# Patient Record
Sex: MALE | Race: WHITE | NOT HISPANIC OR LATINO | Employment: FULL TIME | URBAN - METROPOLITAN AREA
[De-identification: names, ages, dates, MRNs, and addresses within clinical notes are randomized per-mention and may not be internally consistent; named-entity substitution may affect disease eponyms.]

---

## 2023-08-24 ENCOUNTER — APPOINTMENT (OUTPATIENT)
Dept: PRIMARY CARE | Facility: CLINIC | Age: 63
End: 2023-08-24
Payer: COMMERCIAL

## 2023-09-15 ENCOUNTER — LAB (OUTPATIENT)
Dept: LAB | Facility: LAB | Age: 63
End: 2023-09-15
Payer: COMMERCIAL

## 2023-09-15 ENCOUNTER — OFFICE VISIT (OUTPATIENT)
Dept: PRIMARY CARE | Facility: CLINIC | Age: 63
End: 2023-09-15
Payer: COMMERCIAL

## 2023-09-15 VITALS
OXYGEN SATURATION: 96 % | BODY MASS INDEX: 28.35 KG/M2 | HEART RATE: 53 BPM | WEIGHT: 198 LBS | DIASTOLIC BLOOD PRESSURE: 66 MMHG | SYSTOLIC BLOOD PRESSURE: 117 MMHG | HEIGHT: 70 IN

## 2023-09-15 DIAGNOSIS — E78.5 HYPERLIPIDEMIA, UNSPECIFIED HYPERLIPIDEMIA TYPE: Primary | ICD-10-CM

## 2023-09-15 DIAGNOSIS — E78.5 HYPERLIPIDEMIA, UNSPECIFIED HYPERLIPIDEMIA TYPE: ICD-10-CM

## 2023-09-15 DIAGNOSIS — M54.2 NECK PAIN: ICD-10-CM

## 2023-09-15 PROBLEM — M18.12 LOCALIZED PRIMARY OSTEOARTHRITIS OF FIRST CARPOMETACARPAL JOINT OF LEFT WRIST: Status: ACTIVE | Noted: 2023-09-15

## 2023-09-15 PROBLEM — C69.90: Status: ACTIVE | Noted: 2023-09-15

## 2023-09-15 LAB
ALANINE AMINOTRANSFERASE (SGPT) (U/L) IN SER/PLAS: 23 U/L (ref 10–52)
ALBUMIN (G/DL) IN SER/PLAS: 4.5 G/DL (ref 3.4–5)
ALKALINE PHOSPHATASE (U/L) IN SER/PLAS: 67 U/L (ref 33–136)
ANION GAP IN SER/PLAS: 13 MMOL/L (ref 10–20)
ASPARTATE AMINOTRANSFERASE (SGOT) (U/L) IN SER/PLAS: 14 U/L (ref 9–39)
BILIRUBIN TOTAL (MG/DL) IN SER/PLAS: 0.6 MG/DL (ref 0–1.2)
CALCIUM (MG/DL) IN SER/PLAS: 9.7 MG/DL (ref 8.6–10.6)
CARBON DIOXIDE, TOTAL (MMOL/L) IN SER/PLAS: 28 MMOL/L (ref 21–32)
CHLORIDE (MMOL/L) IN SER/PLAS: 104 MMOL/L (ref 98–107)
CHOLESTEROL (MG/DL) IN SER/PLAS: 158 MG/DL (ref 0–199)
CHOLESTEROL IN HDL (MG/DL) IN SER/PLAS: 24.3 MG/DL
CHOLESTEROL/HDL RATIO: 6.5
CREATININE (MG/DL) IN SER/PLAS: 1.19 MG/DL (ref 0.5–1.3)
GFR MALE: 69 ML/MIN/1.73M2
GLUCOSE (MG/DL) IN SER/PLAS: 98 MG/DL (ref 74–99)
LDL: ABNORMAL MG/DL (ref 0–99)
POTASSIUM (MMOL/L) IN SER/PLAS: 4.3 MMOL/L (ref 3.5–5.3)
PROSTATE SPECIFIC AG (NG/ML) IN SER/PLAS: 0.31 NG/ML (ref 0–4)
PROTEIN TOTAL: 7 G/DL (ref 6.4–8.2)
SODIUM (MMOL/L) IN SER/PLAS: 141 MMOL/L (ref 136–145)
TRIGLYCERIDE (MG/DL) IN SER/PLAS: 520 MG/DL (ref 0–149)
UREA NITROGEN (MG/DL) IN SER/PLAS: 20 MG/DL (ref 6–23)
VLDL: ABNORMAL MG/DL (ref 0–40)

## 2023-09-15 PROCEDURE — 36415 COLL VENOUS BLD VENIPUNCTURE: CPT

## 2023-09-15 PROCEDURE — 80061 LIPID PANEL: CPT

## 2023-09-15 PROCEDURE — 80053 COMPREHEN METABOLIC PANEL: CPT

## 2023-09-15 PROCEDURE — 84153 ASSAY OF PSA TOTAL: CPT

## 2023-09-15 PROCEDURE — 99214 OFFICE O/P EST MOD 30 MIN: CPT | Performed by: FAMILY MEDICINE

## 2023-09-15 RX ORDER — OMEGA-3-ACID ETHYL ESTERS 1 G/1
2 CAPSULE, LIQUID FILLED ORAL DAILY
COMMUNITY
Start: 2014-08-21 | End: 2023-11-21

## 2023-09-15 RX ORDER — SIMVASTATIN 20 MG/1
20 TABLET, FILM COATED ORAL DAILY
Qty: 90 TABLET | Refills: 3 | Status: SHIPPED | OUTPATIENT
Start: 2023-09-15

## 2023-09-15 RX ORDER — SIMVASTATIN 20 MG/1
1 TABLET, FILM COATED ORAL DAILY
COMMUNITY
Start: 2014-08-21 | End: 2023-09-15 | Stop reason: SDUPTHER

## 2023-09-15 ASSESSMENT — ENCOUNTER SYMPTOMS
RESPIRATORY NEGATIVE: 1
MUSCULOSKELETAL NEGATIVE: 1
CARDIOVASCULAR NEGATIVE: 1
GASTROINTESTINAL NEGATIVE: 1
NEUROLOGICAL NEGATIVE: 1

## 2023-09-27 ENCOUNTER — TELEPHONE (OUTPATIENT)
Dept: PRIMARY CARE | Facility: CLINIC | Age: 63
End: 2023-09-27
Payer: COMMERCIAL

## 2023-09-27 NOTE — TELEPHONE ENCOUNTER
Patient waiting on results from blood and xrays.  Had appointment on 9/15/23.  Very upset with the office that he is not getting calls returned.

## 2023-09-28 NOTE — TELEPHONE ENCOUNTER
Patient called asking for the x-ray results. It has been 13 days and still no response. Patient is upset.

## 2023-10-02 ENCOUNTER — TELEPHONE (OUTPATIENT)
Dept: PRIMARY CARE | Facility: CLINIC | Age: 63
End: 2023-10-02
Payer: COMMERCIAL

## 2023-10-02 DIAGNOSIS — M50.20 DISCOGENIC SYNDROME, CERVICAL: Primary | ICD-10-CM

## 2023-10-02 NOTE — TELEPHONE ENCOUNTER
I gave the message on His X-Ray. If you want to order MRI, he will go get, and then schedule a fuv with you.

## 2023-10-16 ENCOUNTER — HOSPITAL ENCOUNTER (OUTPATIENT)
Dept: RADIOLOGY | Facility: CLINIC | Age: 63
Discharge: HOME | End: 2023-10-16
Payer: COMMERCIAL

## 2023-10-16 DIAGNOSIS — M50.20 DISCOGENIC SYNDROME, CERVICAL: ICD-10-CM

## 2023-10-16 PROCEDURE — 72141 MRI NECK SPINE W/O DYE: CPT | Performed by: RADIOLOGY

## 2023-10-16 PROCEDURE — 72141 MRI NECK SPINE W/O DYE: CPT

## 2023-10-25 ENCOUNTER — TELEPHONE (OUTPATIENT)
Dept: PRIMARY CARE | Facility: CLINIC | Age: 63
End: 2023-10-25
Payer: COMMERCIAL

## 2023-11-01 PROBLEM — E78.5 HYPERLIPEMIA: Status: ACTIVE | Noted: 2023-11-01

## 2023-11-01 PROBLEM — R35.1 NOCTURIA: Status: ACTIVE | Noted: 2023-11-01

## 2023-11-01 PROBLEM — M25.373 INSTABILITY OF ANKLE: Status: ACTIVE | Noted: 2023-11-01

## 2023-11-01 PROBLEM — C80.1 MALIGNANT NEOPLASM (MULTI): Status: ACTIVE | Noted: 2023-11-01

## 2023-11-01 PROBLEM — M25.879 ANKLE IMPINGEMENT SYNDROME: Status: ACTIVE | Noted: 2023-11-01

## 2023-11-01 PROBLEM — S39.012A STRAIN OF LUMBAR REGION: Status: ACTIVE | Noted: 2023-11-01

## 2023-11-01 PROBLEM — M76.70 PERONEAL TENDONITIS: Status: ACTIVE | Noted: 2023-11-01

## 2023-11-02 ENCOUNTER — OFFICE VISIT (OUTPATIENT)
Dept: PRIMARY CARE | Facility: CLINIC | Age: 63
End: 2023-11-02
Payer: COMMERCIAL

## 2023-11-02 VITALS
OXYGEN SATURATION: 96 % | HEIGHT: 70 IN | WEIGHT: 196 LBS | BODY MASS INDEX: 28.06 KG/M2 | DIASTOLIC BLOOD PRESSURE: 74 MMHG | HEART RATE: 69 BPM | SYSTOLIC BLOOD PRESSURE: 115 MMHG

## 2023-11-02 DIAGNOSIS — E78.5 HYPERLIPIDEMIA, UNSPECIFIED HYPERLIPIDEMIA TYPE: Primary | ICD-10-CM

## 2023-11-02 PROCEDURE — 99213 OFFICE O/P EST LOW 20 MIN: CPT | Performed by: FAMILY MEDICINE

## 2023-11-02 RX ORDER — EZETIMIBE 10 MG/1
10 TABLET ORAL DAILY
Qty: 30 TABLET | Refills: 5 | Status: SHIPPED | OUTPATIENT
Start: 2023-11-02 | End: 2023-11-21

## 2023-11-02 ASSESSMENT — ENCOUNTER SYMPTOMS
MUSCULOSKELETAL NEGATIVE: 1
CARDIOVASCULAR NEGATIVE: 1
RESPIRATORY NEGATIVE: 1
NEUROLOGICAL NEGATIVE: 1
CONSTITUTIONAL NEGATIVE: 1

## 2023-11-02 NOTE — PROGRESS NOTES
Subjective   Patient ID: Jayson Madrid is a 63 y.o. male who presents for Follow-up (MRI and BW).  HPI  Patient without significant complaints comes in for review of blood work and MRI.    Patient's MRI shows worsening of his spinal canal stenosis in the cervical spine I told him what we will do is we will send him over to neurosurgery just to get an evaluation although I think at this point less he is having loss of strength in his arms probably is not a candidate for surgery.    Patient is also has elevated triglycerides I told him the risk factors of high triglycerides we will try and get him down little Zetia.  We will recheck his blood work in about 6 to 8 weeks  Review of Systems   Constitutional: Negative.    HENT: Negative.     Respiratory: Negative.     Cardiovascular: Negative.    Musculoskeletal: Negative.    Neurological: Negative.        Objective   Physical Exam  General no acute process no icterus well-hydrated alert active oriented    HEENT normocephalic no palpable tenderness eyes pupils equal reactive light and accommodation extraocular muscles intact no icterus and/or erythema ears benign external auditory canal no gross deformities nose no discharge drainage erythema bleeding throat no erythema.    Heart regular rate and rhythm without S3-S4 or murmur    Lungs clear to auscultation x2 no rales or rhonchi    Abdomen soft nontender nondistended no palpable masses no organomegaly splenomegaly.    Integument no rash no lumps bumps or concerning lesions.    Neurologic no tics tremors or seizures no decreased range of motion or ataxia.    Musculoskeletal good range of motion no gross abnormalities noted  Assessment/Plan

## 2023-11-21 DIAGNOSIS — E78.5 HYPERLIPIDEMIA, UNSPECIFIED HYPERLIPIDEMIA TYPE: ICD-10-CM

## 2023-11-21 RX ORDER — EZETIMIBE 10 MG/1
10 TABLET ORAL DAILY
Qty: 90 TABLET | Refills: 1 | Status: SHIPPED | OUTPATIENT
Start: 2023-11-21 | End: 2024-05-19

## 2023-11-21 RX ORDER — OMEGA-3-ACID ETHYL ESTERS 1 G/1
1 CAPSULE, LIQUID FILLED ORAL 2 TIMES DAILY
Qty: 360 CAPSULE | Refills: 0 | Status: SHIPPED | OUTPATIENT
Start: 2023-11-21 | End: 2024-05-15

## 2023-12-01 NOTE — TELEPHONE ENCOUNTER
Spoke with Teri at Dr. Tavares Barreto's office (neurology).  She made appointment for Jayson for 02/01/23 at 12:30 pm at the  office in [facility].  Called Jayson and gave him the information.  Also let him know Dr. Barreto's office would be sending a confirmation letter.

## 2023-12-18 ENCOUNTER — CONTACT MOVED (OUTPATIENT)
Age: 63
End: 2023-12-18

## 2023-12-22 DIAGNOSIS — E78.5 HYPERLIPIDEMIA, UNSPECIFIED HYPERLIPIDEMIA TYPE: Primary | ICD-10-CM

## 2024-02-01 ENCOUNTER — OFFICE VISIT (OUTPATIENT)
Dept: NEUROSURGERY | Facility: CLINIC | Age: 64
End: 2024-02-01
Payer: COMMERCIAL

## 2024-02-01 VITALS
SYSTOLIC BLOOD PRESSURE: 118 MMHG | DIASTOLIC BLOOD PRESSURE: 70 MMHG | WEIGHT: 200 LBS | HEIGHT: 71 IN | TEMPERATURE: 97.3 F | BODY MASS INDEX: 28 KG/M2 | HEART RATE: 55 BPM

## 2024-02-01 DIAGNOSIS — E78.5 HYPERLIPIDEMIA, UNSPECIFIED HYPERLIPIDEMIA TYPE: ICD-10-CM

## 2024-02-01 DIAGNOSIS — M47.22 CERVICAL SPONDYLOSIS WITH RADICULOPATHY: Primary | ICD-10-CM

## 2024-02-01 PROCEDURE — 99203 OFFICE O/P NEW LOW 30 MIN: CPT | Performed by: NEUROLOGICAL SURGERY

## 2024-02-01 PROCEDURE — 1036F TOBACCO NON-USER: CPT | Performed by: NEUROLOGICAL SURGERY

## 2024-02-01 ASSESSMENT — PAIN SCALES - GENERAL: PAINLEVEL: 0-NO PAIN

## 2024-02-01 ASSESSMENT — PATIENT HEALTH QUESTIONNAIRE - PHQ9
1. LITTLE INTEREST OR PLEASURE IN DOING THINGS: NOT AT ALL
2. FEELING DOWN, DEPRESSED OR HOPELESS: NOT AT ALL
SUM OF ALL RESPONSES TO PHQ9 QUESTIONS 1 AND 2: 0

## 2024-02-01 ASSESSMENT — ENCOUNTER SYMPTOMS: OCCASIONAL FEELINGS OF UNSTEADINESS: 0

## 2024-02-01 NOTE — PROGRESS NOTES
[facility]  Department of Neurological Surgery  New Patient Visit    History of Present Illness:  Jayson Madrid is a 63 y.o. year old male who presents to the spine clinic with no present complaints but several months back he woke up every night with his arms aching and feeling numb and tingling into the hands.  He was concerned about this and his primary physician ordered an MRI of the cervical spine and showed that he had significant spondylosis of the cervical spine especially at C5-6 and C6/7.  Since that time his neck pain is nonexistent and he has no more symptoms into his arms.  He is doing everything he wants to do.    Prior Spine Surgeries: No prior cervical spine surgeries    Physical Therapy: None recently  Diabetic: No  Osteoporosis: No  Patient's BMI is Body mass index is 27.89 kg/m².    14/14 systems reviewed and negative other than what is listed in the history of present illness    Patient Active Problem List   Diagnosis    Melanoma of eye (CMS/HCC)    Localized primary osteoarthritis of first carpometacarpal joint of left wrist    Ankle impingement syndrome    Hyperlipemia    Malignant neoplasm (CMS/HCC)    Nocturia    Peroneal tendonitis    Strain of lumbar region    Instability of ankle    Cervical spondylosis with radiculopathy     No past medical history on file.  Past Surgical History:   Procedure Laterality Date    OTHER SURGICAL HISTORY  08/07/2019    Eye surgery     Social History     Tobacco Use    Smoking status: Never    Smokeless tobacco: Never   Substance Use Topics    Alcohol use: Yes     Comment: Rarely     family history includes No Known Problems in his father and mother.    Current Outpatient Medications:     ezetimibe (Zetia) 10 mg tablet, Take 1 tablet (10 mg) by mouth once daily., Disp: 90 tablet, Rfl: 1    omega-3 acid ethyl esters (Lovaza) 1 gram capsule, Take 1 capsule (1 g) by mouth 2 times a day., Disp: 360 capsule, Rfl: 0    simvastatin (Zocor)  20 mg tablet, Take 1 tablet (20 mg) by mouth once daily., Disp: 90 tablet, Rfl: 3  No Known Allergies    Physical Examination      General: NAD, AOx 3,  no aphasia or dysarthria, normal fund of knowledge  Cranial Nerves II-XII: VFF, PERRL, EOMI, Face Symm, Facial SILT, Palate/Tongue midline and symmetric  Motor: 5/5 Throughout all extremities,  No drift, no dysmetria on finger to nose  Sensation: SILT and PP throughout all extremities  DTRS: 2+ Throughout, No Hoffmans or Clonus  Gait is nonantalgic, he can toe and heel walk without difficulty, he has a negative Romberg    Results    I personally reviewed and interpreted the imaging results which included the MRI which was done on 10/16/2023 shows significant spondylosis with significant retrolisthesis of C6 on C7 with some foraminal stenosis at both C5-6 and C6-7.  No sign of any severe cord compression.    Assessment and Plan:    Jayson Madrid is a 63 y.o. year old male who presents to the spine clinic with with a history of arm tingling and numbness into the hands that has resolved.  I went over with him things to look out for in case spinal cord compression were to become a problem for him.  After we discussed all these he has a good understanding of his condition.  He knows he can contact us on an as-needed basis.      I have reviewed all prior documentation and reviewed the electronic medical record since admission. I have personally have reviewed all advanced imaging not just the reports and used my interpretation as documented as the relevant findings. I have reviewed the risks and benefits of all treatment recommendations listed in this note with the patient and family. I spent a total of 35 minutes in service to this patient's care during this date of service.      The above clinical summary has been dictated with voice recognition software. It has not been proofread for grammatical errors, typographical mistakes, or other semantic  inconsistencies.    Thank you for visiting our office today. It was our pleasure to take part in your healthcare.     Do not hesitate to call with any questions regarding your plan of care after leaving. My office can be reached at [phone] M-F 8am-4pm.     To clinicians, thank you very much for this kind referral. It is a privilege to partner with you in the care of your patients. My office would be delighted to assist you with any further consultations or with questions regarding the plan of care outlined. Do not hesitate to call the office or contact me directly.     Sincerely,    Tavares Barreto MD, FAANS, FACS  Board Certified Neurological Surgeon  , Department of Neurological Surgery  [facility] [employer]    [facility]  [address]  [facility] [address]  [address]  [address]  [address]  [place] [address]    Phone: [phone]  Fax: [phone]

## 2024-02-07 NOTE — TELEPHONE ENCOUNTER
Gave pt message on MRI. He is scheduled for 11/2 for FUV  
Patient wants the results of his mri. He is upset because its been almost 3 weeks and he was never notified of anything.   
Constricted

## 2024-02-23 ENCOUNTER — OFFICE VISIT (OUTPATIENT)
Dept: PRIMARY CARE | Facility: CLINIC | Age: 64
End: 2024-02-23
Payer: COMMERCIAL

## 2024-02-23 VITALS
BODY MASS INDEX: 28.56 KG/M2 | SYSTOLIC BLOOD PRESSURE: 125 MMHG | DIASTOLIC BLOOD PRESSURE: 77 MMHG | OXYGEN SATURATION: 93 % | HEIGHT: 71 IN | WEIGHT: 204 LBS | HEART RATE: 57 BPM

## 2024-02-23 DIAGNOSIS — H65.92 LEFT OTITIS MEDIA WITH EFFUSION: Primary | ICD-10-CM

## 2024-02-23 DIAGNOSIS — H93.12 TINNITUS OF LEFT EAR: ICD-10-CM

## 2024-02-23 DIAGNOSIS — E78.5 HYPERLIPIDEMIA, UNSPECIFIED HYPERLIPIDEMIA TYPE: ICD-10-CM

## 2024-02-23 PROBLEM — M25.373 INSTABILITY OF ANKLE: Status: RESOLVED | Noted: 2023-11-01 | Resolved: 2024-02-23

## 2024-02-23 PROBLEM — S39.012A STRAIN OF LUMBAR REGION: Status: RESOLVED | Noted: 2023-11-01 | Resolved: 2024-02-23

## 2024-02-23 PROBLEM — M25.879 ANKLE IMPINGEMENT SYNDROME: Status: RESOLVED | Noted: 2023-11-01 | Resolved: 2024-02-23

## 2024-02-23 PROBLEM — M76.70 PERONEAL TENDONITIS: Status: RESOLVED | Noted: 2023-11-01 | Resolved: 2024-02-23

## 2024-02-23 PROBLEM — R35.1 NOCTURIA: Status: RESOLVED | Noted: 2023-11-01 | Resolved: 2024-02-23

## 2024-02-23 PROBLEM — M47.22 CERVICAL SPONDYLOSIS WITH RADICULOPATHY: Status: RESOLVED | Noted: 2024-02-01 | Resolved: 2024-02-23

## 2024-02-23 PROBLEM — M18.12 LOCALIZED PRIMARY OSTEOARTHRITIS OF FIRST CARPOMETACARPAL JOINT OF LEFT WRIST: Status: RESOLVED | Noted: 2023-09-15 | Resolved: 2024-02-23

## 2024-02-23 PROCEDURE — 99213 OFFICE O/P EST LOW 20 MIN: CPT | Performed by: FAMILY MEDICINE

## 2024-02-23 PROCEDURE — 1036F TOBACCO NON-USER: CPT | Performed by: FAMILY MEDICINE

## 2024-02-23 RX ORDER — PREDNISONE 20 MG/1
40 TABLET ORAL DAILY
Qty: 10 TABLET | Refills: 0 | Status: SHIPPED | OUTPATIENT
Start: 2024-02-23 | End: 2024-02-28

## 2024-02-23 NOTE — PROGRESS NOTES
Subjective   Patient ID: Jayson Madrid is a 63 y.o. male who presents for Tinnitus.  HPI  Tinnitus wo uri sx patient states that a couple days ago week ago he developed acute tinnitus in bilateral ears no ear pain no fever or chills.  Patient started no new medications does have a history of some hearing loss.  This point we evaluated his years which showed no acute processes told him it could be due to some fluid buildup in the middle ear we will try a short course of steroids and see if that helps and then we referred him over to ear nose and throat.    My thinking on this is he is got industrial hearing loss with secondary tinnitus but he could possibly have some serous otitis which precipitated the event  Review of Systems   Constitutional: Negative.    HENT:  Positive for hearing loss and tinnitus. Negative for congestion, sinus pressure and sinus pain.    Respiratory: Negative.     Cardiovascular: Negative.    Neurological: Negative.    Psychiatric/Behavioral: Negative.         Objective   Physical Exam  General no acute process no icterus well-hydrated alert active oriented    HEENT normocephalic no palpable tenderness eyes pupils equal reactive light and accommodation extraocular muscles intact no icterus and/or erythema ears benign external auditory canal no gross deformities nose no discharge drainage erythema bleeding throat no erythema.    Heart regular rate and rhythm without S3-S4 or murmur    Lungs clear to auscultation x2 no rales or rhonchi    Abdomen soft nontender nondistended no palpable masses no organomegaly splenomegaly.    Integument no rash no lumps bumps or concerning lesions.    Neurologic no tics tremors or seizures no decreased range of motion or ataxia.    Musculoskeletal good range of motion no gross abnormalities noted  Assessment/Plan            Ady Davidson DO 02/23/24 1:28 PM

## 2024-02-24 ASSESSMENT — ENCOUNTER SYMPTOMS
NEUROLOGICAL NEGATIVE: 1
RESPIRATORY NEGATIVE: 1
SINUS PRESSURE: 0
CONSTITUTIONAL NEGATIVE: 1
PSYCHIATRIC NEGATIVE: 1
SINUS PAIN: 0
CARDIOVASCULAR NEGATIVE: 1

## 2024-02-29 ENCOUNTER — CLINICAL SUPPORT (OUTPATIENT)
Dept: AUDIOLOGY | Facility: CLINIC | Age: 64
End: 2024-02-29
Payer: COMMERCIAL

## 2024-02-29 DIAGNOSIS — H93.12 TINNITUS, LEFT: Primary | ICD-10-CM

## 2024-02-29 DIAGNOSIS — H91.8X3 ASYMMETRICAL HEARING LOSS: ICD-10-CM

## 2024-02-29 PROCEDURE — 92557 COMPREHENSIVE HEARING TEST: CPT | Performed by: AUDIOLOGIST

## 2024-02-29 PROCEDURE — 92550 TYMPANOMETRY & REFLEX THRESH: CPT | Performed by: AUDIOLOGIST

## 2024-02-29 NOTE — PROGRESS NOTES
"AUDIOLOGY ADULT AUDIOMETRIC EVALUATION      Name:  Jayson Madrid  :  1960  Age:  63 y.o.  Date of Evaluation:  tinnitus    HISTORY  Reason for visit: left tinnitus  Mr. Madrid is seen 2024 at the request of Javon Thornton D.O. for an evaluation of hearing.      Chief complaint:    Left tinnitus; treated with steroids. It initially  improved with treatment with oral steroids but it returned yesterday (the last day that he was taking the steroids).  Tinnitus started around mid-February.      Hearing loss:  no concerns; seems symmetric to patient  Tinnitus:   left tinnitus starting in mid-February  Otitis Media: denies  Otologic surgical history:  denied ear surgery;  radiation and laser surgery for cancer of right eye  Dizziness/imbalance:  feels dizzy/spinning sometimes if going up stairs quickly, standing up too quickly  Otalgia:  left, slight, around ear; 4/10, not bothersome  Ear pressure/fullness:  denies  History of excessive noise exposure:  yes  Other: history of melanoma of right eye    Hearing aid history: none         EVALUATION  Please find audiogram in \"Media\" tab (Document Type:  Audiology Report) or included at the bottom of this note.    RESULTS   Otoscopic Evaluation: right, clear canal; left, white bump on ear canal     Immittance Measures (226 Hz probe tone):   Tympanometry is consistent with normal middle ear pressure and normal tympanic membrane mobility bilaterally.       Ipsilateral acoustic reflexes were present for 500-4000 Hz bilaterally.      Test technique:  standard behavioral technique via TDH earphones.  Reliability is good.    Pure Tone Audiometry:    Right ear:  Hearing sensitivity is in the normal hearing to mild hearing loss range.    Left ear: Hearing sensitivity is in the normal hearing to moderate hearing loss range bilaterally.  Note asymmetry for 8127-8706 Hz (left worse than right)     Speech Audiometry:        Right Ear:  Speech Reception Threshold (SRT) was " obtained at 10 dBHL                 Speech discrimination score was 100% in quiet when words were presented at 60 dBHL      Left Ear:  Speech Reception Threshold (SRT) was obtained at 5 dBHL                 Speech discrimination score was 100% in quiet when words were presented at 55 dBHL    IMPRESSIONS:  Patient is expected to have communication difficulty in adverse listening environments.    Patient is expected to benefit from effective communication strategies.       RECOMMENDATIONS   Continue with ENT follow-up.  Due to left hearing loss and recent onset of left tinnitus (and in consultation with LILIAM Denton CNP), recommended that patient be seen earlier than scheduled 3/27/2024 appointment.   (Patient was able to be scheduled with Colette Denton CNP for 3/6/2024.)  Reassess hearing as medically indicated and at least annually.     Continue with medical follow-up as indicated.       PATIENT EDUCATION  Discussed results and recommendations with patient.  Questions were addressed and the patient was encouraged to contact our department should concerns arise.       KALYN Abel, CCC-A  Licensed Audiologist

## 2024-03-06 ENCOUNTER — LAB (OUTPATIENT)
Dept: LAB | Facility: LAB | Age: 64
End: 2024-03-06
Payer: COMMERCIAL

## 2024-03-06 ENCOUNTER — OFFICE VISIT (OUTPATIENT)
Dept: OTOLARYNGOLOGY | Facility: CLINIC | Age: 64
End: 2024-03-06
Payer: COMMERCIAL

## 2024-03-06 VITALS
TEMPERATURE: 98.1 F | DIASTOLIC BLOOD PRESSURE: 72 MMHG | SYSTOLIC BLOOD PRESSURE: 118 MMHG | HEART RATE: 60 BPM | HEIGHT: 69 IN | WEIGHT: 205 LBS | BODY MASS INDEX: 30.36 KG/M2

## 2024-03-06 DIAGNOSIS — H61.22 EXCESSIVE CERUMEN IN LEFT EAR CANAL: ICD-10-CM

## 2024-03-06 DIAGNOSIS — E78.5 HYPERLIPIDEMIA, UNSPECIFIED HYPERLIPIDEMIA TYPE: ICD-10-CM

## 2024-03-06 DIAGNOSIS — H90.3 ASYMMETRICAL SENSORINEURAL HEARING LOSS: ICD-10-CM

## 2024-03-06 DIAGNOSIS — D16.4 OSTEOMA OF EAR CANAL: ICD-10-CM

## 2024-03-06 DIAGNOSIS — H93.12 TINNITUS OF LEFT EAR: Primary | ICD-10-CM

## 2024-03-06 LAB
ALBUMIN SERPL BCP-MCNC: 4.4 G/DL (ref 3.4–5)
ALP SERPL-CCNC: 83 U/L (ref 33–136)
ALT SERPL W P-5'-P-CCNC: 36 U/L (ref 10–52)
ANION GAP SERPL CALC-SCNC: 15 MMOL/L (ref 10–20)
AST SERPL W P-5'-P-CCNC: 18 U/L (ref 9–39)
BILIRUB SERPL-MCNC: 0.7 MG/DL (ref 0–1.2)
BUN SERPL-MCNC: 13 MG/DL (ref 6–23)
CALCIUM SERPL-MCNC: 9.5 MG/DL (ref 8.6–10.6)
CHLORIDE SERPL-SCNC: 104 MMOL/L (ref 98–107)
CHOLEST SERPL-MCNC: 189 MG/DL (ref 0–199)
CHOLESTEROL/HDL RATIO: 6.7
CO2 SERPL-SCNC: 24 MMOL/L (ref 21–32)
CREAT SERPL-MCNC: 1.07 MG/DL (ref 0.5–1.3)
EGFRCR SERPLBLD CKD-EPI 2021: 79 ML/MIN/1.73M*2
GLUCOSE SERPL-MCNC: 92 MG/DL (ref 74–99)
HDLC SERPL-MCNC: 28.1 MG/DL
LDLC SERPL CALC-MCNC: ABNORMAL MG/DL
NON HDL CHOLESTEROL: 161 MG/DL (ref 0–149)
POTASSIUM SERPL-SCNC: 4.2 MMOL/L (ref 3.5–5.3)
PROT SERPL-MCNC: 7 G/DL (ref 6.4–8.2)
SODIUM SERPL-SCNC: 139 MMOL/L (ref 136–145)
TRIGL SERPL-MCNC: 480 MG/DL (ref 0–149)
VLDL: ABNORMAL

## 2024-03-06 PROCEDURE — 80061 LIPID PANEL: CPT

## 2024-03-06 PROCEDURE — 99214 OFFICE O/P EST MOD 30 MIN: CPT | Performed by: NURSE PRACTITIONER

## 2024-03-06 PROCEDURE — 1036F TOBACCO NON-USER: CPT | Performed by: NURSE PRACTITIONER

## 2024-03-06 PROCEDURE — 80053 COMPREHEN METABOLIC PANEL: CPT

## 2024-03-06 PROCEDURE — 99204 OFFICE O/P NEW MOD 45 MIN: CPT | Performed by: NURSE PRACTITIONER

## 2024-03-06 PROCEDURE — 36415 COLL VENOUS BLD VENIPUNCTURE: CPT

## 2024-03-06 RX ORDER — PREDNISONE 10 MG/1
TABLET ORAL
Qty: 57 TABLET | Refills: 0 | Status: SHIPPED | OUTPATIENT
Start: 2024-03-06

## 2024-03-06 SDOH — ECONOMIC STABILITY: FOOD INSECURITY: WITHIN THE PAST 12 MONTHS, YOU WORRIED THAT YOUR FOOD WOULD RUN OUT BEFORE YOU GOT MONEY TO BUY MORE.: NEVER TRUE

## 2024-03-06 SDOH — ECONOMIC STABILITY: FOOD INSECURITY: WITHIN THE PAST 12 MONTHS, THE FOOD YOU BOUGHT JUST DIDN'T LAST AND YOU DIDN'T HAVE MONEY TO GET MORE.: NEVER TRUE

## 2024-03-06 ASSESSMENT — COLUMBIA-SUICIDE SEVERITY RATING SCALE - C-SSRS
1. IN THE PAST MONTH, HAVE YOU WISHED YOU WERE DEAD OR WISHED YOU COULD GO TO SLEEP AND NOT WAKE UP?: NO
6. HAVE YOU EVER DONE ANYTHING, STARTED TO DO ANYTHING, OR PREPARED TO DO ANYTHING TO END YOUR LIFE?: NO
2. HAVE YOU ACTUALLY HAD ANY THOUGHTS OF KILLING YOURSELF?: NO

## 2024-03-06 ASSESSMENT — ENCOUNTER SYMPTOMS
OCCASIONAL FEELINGS OF UNSTEADINESS: 0
LOSS OF SENSATION IN FEET: 0
DEPRESSION: 0

## 2024-03-06 ASSESSMENT — LIFESTYLE VARIABLES
HOW OFTEN DO YOU HAVE SIX OR MORE DRINKS ON ONE OCCASION: NEVER
AUDIT-C TOTAL SCORE: 1
SKIP TO QUESTIONS 9-10: 1
HOW MANY STANDARD DRINKS CONTAINING ALCOHOL DO YOU HAVE ON A TYPICAL DAY: 1 OR 2
HOW OFTEN DO YOU HAVE A DRINK CONTAINING ALCOHOL: MONTHLY OR LESS

## 2024-03-06 ASSESSMENT — PAIN SCALES - GENERAL: PAINLEVEL: 3

## 2024-03-06 ASSESSMENT — PATIENT HEALTH QUESTIONNAIRE - PHQ9
SUM OF ALL RESPONSES TO PHQ9 QUESTIONS 1 AND 2: 0
1. LITTLE INTEREST OR PLEASURE IN DOING THINGS: NOT AT ALL
2. FEELING DOWN, DEPRESSED OR HOPELESS: NOT AT ALL

## 2024-03-06 NOTE — PROGRESS NOTES
Subjective   Patient ID: Jayson Madrid is a 63 y.o. male who presents for left ear pain.    HPI  Patient here for left-sided tinnitus. This has been going on about 1 month. It stopped and then came back. It was intermittent. Did Prednisone 40 mg for 5 days. That stopped, but then it came back. No trouble hearing. Slight pain in the left ear. No drainage. 2-3 months ago he felt dizzy and tripped coming up the stairs.   No previous surgery. Previous loud noise exposure. No family history of hearing loss.   Felt tinnitus was sudden. No cold or flu prior.     Patient Active Problem List   Diagnosis    Melanoma of eye (CMS/HCC)    Hyperlipemia    Malignant neoplasm (CMS/HCC)     Past Surgical History:   Procedure Laterality Date    OTHER SURGICAL HISTORY  08/07/2019    Eye surgery     Review of Systems    All other systems have been reviewed and are negative for complaints except for those mentioned in history of present illness, past medical history and problem list.    Objective   Physical Exam    Constitutional: No fever, chills, weight loss or weight gain  General appearance: Appears well, well-nourished, well groomed. No acute distress.    Communication: Normal communication    Psychiatric: Oriented to person, place and time. Normal mood and affect.    Neurologic: Cranial nerves II-XII grossly intact and symmetric bilaterally.    Head and Face:  Head: Atraumatic with no masses, lesions or scarring.  Face: Normal symmetry. No scars or deformities.  TMJ: Normal, no trismus.    Eyes: Conjunctiva not edematous or erythematous.    Right Ear: External inspection of ear with no deformity, scars, or masses. EAC is with minimal non-occluding cerumen that was removed using the microscope and alligator forceps. Osteoma noted at the lateral canal.  TM is intact with no sign of infection, effusion, or retraction.  No perforation seen.     Left Ear: External inspection of ear with no deformity, scars, or masses. EAC is clear.   TM is intact with no sign of infection, effusion, or retraction.  No perforation seen.     Nose: External inspection of nose: No nasal lesions, lacerations or scars. Anterior rhinoscopy with limited visualization past the inferior turbinates. No tenderness on frontal or maxillary sinus palpation.    Oral Cavity/Mouth: Oral cavity and oropharynx mucosa moist and pink. No lesions or masses. Dentition normal. Tonsils appear normal. Uvula is midline. Tongue with no masses or lesions. Tongue with good mobility. The oropharynx is clear.    Neck: Normal appearing, symmetric, trachea midline.     Cardiovascular: Examination of peripheral vascular system shows no clubbing or cyanosis.    Respiratory: No respiratory distress increased work of breathing. Inspection of the chest with symmetric chest expansion and normal respiratory effort.    Skin: No head and neck rashes.    Lymph nodes: No adenopathy.     Diagnostic Results            Assessment/Plan   Diagnoses and all orders for this visit:  Tinnitus of left ear  Asymmetrical sensorineural hearing loss  I personally interpreted audiogram from 2/29/2024.  Per patient, this appears to be sudden in nature. We had a detailed discussion regarding options. Since tinnitus improved on oral steroids, we discussed adding an additional regimen since it was not a high dose I would typically prescribe for sudden SNHL. Although his hearing is good, he does have a high frequency HL on that side and the tinnitus is sudden. Patient tolerated Prednisone in the past. Denies diabetes and cardiac issues- wishes to pursue. He then will follow up when finished. Can determine if we want to monitor or refer for IT steroid injection. Also discussed MRI IAC to rule out retrocochlear pathology- patient will think about this since he just MRI of the spine done.   Will follow up once high-side steroid taper is complete.   All questions answered to patient satisfaction.   Excessive cerumen in left ear  canal, Osteoma of ear canal  Ears was cleaned.     All questions answered to patient satisfaction.     LA Loyd-CNP 03/06/24 8:24 AM

## 2024-03-27 ENCOUNTER — APPOINTMENT (OUTPATIENT)
Dept: OTOLARYNGOLOGY | Facility: CLINIC | Age: 64
End: 2024-03-27
Payer: COMMERCIAL

## 2024-04-22 DIAGNOSIS — E78.5 HYPERLIPIDEMIA, UNSPECIFIED HYPERLIPIDEMIA TYPE: Primary | ICD-10-CM

## 2024-04-22 RX ORDER — FENOFIBRATE 54 MG/1
54 TABLET ORAL DAILY
Qty: 30 TABLET | Refills: 11 | Status: SHIPPED | OUTPATIENT
Start: 2024-04-22 | End: 2024-04-25

## 2024-04-23 DIAGNOSIS — E78.5 HYPERLIPIDEMIA, UNSPECIFIED HYPERLIPIDEMIA TYPE: ICD-10-CM

## 2024-04-25 RX ORDER — FENOFIBRATE 54 MG/1
54 TABLET ORAL DAILY
Qty: 90 TABLET | Refills: 0 | Status: SHIPPED | OUTPATIENT
Start: 2024-04-25

## 2024-05-15 DIAGNOSIS — E78.5 HYPERLIPIDEMIA, UNSPECIFIED HYPERLIPIDEMIA TYPE: ICD-10-CM

## 2024-05-15 RX ORDER — OMEGA-3-ACID ETHYL ESTERS 1 G/1
1 CAPSULE, LIQUID FILLED ORAL 2 TIMES DAILY
Qty: 360 CAPSULE | Refills: 3 | Status: SHIPPED | OUTPATIENT
Start: 2024-05-15

## 2024-07-03 ENCOUNTER — OFFICE VISIT (OUTPATIENT)
Dept: OTOLARYNGOLOGY | Facility: CLINIC | Age: 64
End: 2024-07-03
Payer: COMMERCIAL

## 2024-07-03 VITALS
HEART RATE: 56 BPM | WEIGHT: 198 LBS | BODY MASS INDEX: 28.35 KG/M2 | OXYGEN SATURATION: 96 % | HEIGHT: 70 IN | SYSTOLIC BLOOD PRESSURE: 116 MMHG | DIASTOLIC BLOOD PRESSURE: 73 MMHG | TEMPERATURE: 98.3 F | RESPIRATION RATE: 18 BRPM

## 2024-07-03 DIAGNOSIS — D16.4 OSTEOMA OF EAR CANAL: ICD-10-CM

## 2024-07-03 DIAGNOSIS — H93.12 TINNITUS OF LEFT EAR: ICD-10-CM

## 2024-07-03 DIAGNOSIS — H90.3 ASYMMETRICAL SENSORINEURAL HEARING LOSS: Primary | ICD-10-CM

## 2024-07-03 PROCEDURE — 99213 OFFICE O/P EST LOW 20 MIN: CPT | Performed by: STUDENT IN AN ORGANIZED HEALTH CARE EDUCATION/TRAINING PROGRAM

## 2024-07-03 PROCEDURE — 1036F TOBACCO NON-USER: CPT | Performed by: STUDENT IN AN ORGANIZED HEALTH CARE EDUCATION/TRAINING PROGRAM

## 2024-07-03 PROCEDURE — 99203 OFFICE O/P NEW LOW 30 MIN: CPT | Performed by: STUDENT IN AN ORGANIZED HEALTH CARE EDUCATION/TRAINING PROGRAM

## 2024-07-03 ASSESSMENT — ENCOUNTER SYMPTOMS
OCCASIONAL FEELINGS OF UNSTEADINESS: 0
LOSS OF SENSATION IN FEET: 0
DEPRESSION: 0

## 2024-07-03 ASSESSMENT — PAIN SCALES - GENERAL: PAINLEVEL: 0-NO PAIN

## 2024-07-03 NOTE — PATIENT INSTRUCTIONS
Patient Education -  Tinnitus      Background  This plain language summary serves as an overview in explaining tinnitus and managing its symptoms. Tinnitus is a sensation of noise or ringing in the ears or head, when there is no real sound. Tinnitus (pronounced ten / ih / tus) affects 10-15% of adults in the [place]. Some people experience tinnitus that goes away on its own. Other people have symptoms that last 6 months or longer and interfere with their life. The information in this summary is based on the 2014 Clinical Practice Guideline: Tinnitus. The guideline includes evidence-based research to support more effective diagnosis and treatment of tinnitus.     What is Tinnitus?   Over 50 million Americans have experienced tinnitus, or ringing in ears, which is the perception of sound without an external source being present.    About one in five people with tinnitus have bothersome tinnitus, which negatively affects their quality of life and/or functional health. Tinnitus may be an intermittent or continuous sound in one or both ears. Its pitch can go from a low roar to a high squeal or whine, or it can have many sounds.    Persistent tinnitus lasts more than six months. Prior to any treatment, it is important to undergo a thorough examination and evaluation by an ENT (ears, nose, and throat) specialist, or otolaryngologist, and an audiologist. Your understanding of tinnitus and its causes will enhance your treatment.    What Are the Symptoms of Tinnitus?  Tinnitus is not a disease per se, but a common symptom, and because it involves the perception of hearing sound or sounds in one or both ears, it is commonly associated with the hearing system. In fact, various parts of the hearing system, including the inner ear, are often responsible for this symptom. At times, it is relatively easy to associate the symptom of tinnitus with specific problems affecting the hearing system; at other times, the  connection is less clear.    Common symptoms of tinnitus include:  - Constant high- or low-pitched ringing in ears  - Intermittent or constant roaring in ears  - Pulsation or beating noises in ears  - Associated with or without hearing loss    What Causes Tinnitus?  Most tinnitus is primary tinnitus, where no cause can be identified aside from hearing loss. Secondary tinnitus is associated with a specific underlying cause that may be treatable. Your ENT specialist will help you distinguish whether your tinnitus is primary or secondary.    Tinnitus may be caused by different parts of the hearing system. The outer ear (pinna and ear canal) may be involved. Excessive ear wax, especially if the wax touches the ear drum, causing pressure and changing how the ear drum vibrates, can result in tinnitus.    Middle ear problems can also cause tinnitus, including middle ear infection (common) and otosclerosis (uncommon), which hardens the tiny ear bones or ossicles. Another rare cause of tinnitus from the middle ear that does not result in hearing loss is muscle spasms in one of the two tiny muscles in the ear. In this case, the tinnitus can be intermittent and sometimes your examiner may also be able to hear the sounds.    Most subjective tinnitus associated with the hearing system originates in the inner ear. Damage and loss of the tiny sensory hair cells in the inner ear (that can be caused by different factors such as noise damage, medications, and age) may also be associated with tinnitus.    One of the preventable causes of tinnitus is excessive noise exposure. In some instances of noise exposure, tinnitus can be noticed even before hearing loss develops, so be careful to take special precautions to protect your ears and hearing in noisy environments.    Medications can also damage inner ear hair cells and cause tinnitus. These include both non-prescription medications such as aspirin and acetaminophen, when taken in  high doses, and prescription medication including certain diuretics and antibiotics. As we age, the incidence of tinnitus increases.    Tinnitus may also originate from an abnormality in, or near, the hearing portion of the brain. These include a variety of uncommon disorders such as damage from head trauma, or a benign tumor called “vestibular schwannoma” (acoustic neuroma).    Tinnitus that sounds like your pulse or heartbeat is known as “pulsatile tinnitus.” Infrequently, pulsatile tinnitus may signal the presence of cardiovascular disease, narrowed arteries, or a vascular tumor in your head and neck, or ear. If you are experiencing this type of tinnitus, you should consult a physician as soon as possible for evaluation.    Finally, non-auditory conditions and lifestyle factors can exacerbate tinnitus. Medical conditions such as temporomandibular joint arthralgia (TMJ), depression, anxiety, insomnia, and muscular stress and fatigue may lead to, or exacerbate, tinnitus.    What Are the Treatment Options?  When you are evaluated for tinnitus, the first thing the doctor will do is obtain a complete history and perform a thorough, targeted physical examination. If your tinnitus is one-sided (unilateral), associated with hearing loss, or persistent, a hearing test, or audiogram, should be ordered. There is typically no need for radiologic testing (X-ray, CT scan or MRI scan) unless your tinnitus is pulsatile or associated with uneven, asymmetric hearing loss or neurological abnormalities. Your doctor will determine how bothersome your tinnitus is by asking you certain questions or having you complete a self-assessment questionnaire.    Although there is no one “cure” for tinnitus, there are several options available that can help patients with tinnitus. Because tinnitus is relatively common and not always worrisome, not all patients need an evaluation. If your ENT specialist finds a specific cause for your tinnitus,  they may be able to offer specific treatment to eliminate the noise. This may include removing wax or hair from your ear canal, treating middle ear fluid, treating arthritis in the jaw joint, etc. For many patients who have experienced tinnitus for less than six months, its natural course is to improve over time, and most people do not go on to have persistent, bothersome tinnitus.    Some patients with hearing loss and tinnitus have improvement with the use of hearing aids, with or without built-in ear-level maskers. Sound therapies that involve simple things like background music or noise or specialized ear-level maskers may be a reasonable treatment option. The effects of tinnitus on quality of life may also be improved by cognitive behavioral therapy (CBT) counseling, which usually involves a series of weekly sessions led by a trained professional.    Tinnitus can be so bothersome in some patients that it causes depression or anxiety; additionally, in a patient with depression and/or anxiety, it may be very difficult to tolerate tinnitus. Consultation with a psychiatrist or psychologist with treatment directed to the underlying condition can be beneficial.    Routine prescription of medications including antidepressants, anticonvulsants, anxiolytics, or intratympanic injection of medications is not recommended for treating tinnitus without an underlying or associated medical problem that may benefit from such treatment.    Dietary supplements for tinnitus treatment are frequently advertised on the internet, television, and radio, but there is no evidence that supplements such as ginkgo biloba, melatonin, zinc, Lipoflavonoid, and vitamin supplements are beneficial for tinnitus.    Acupuncture may or may not be help your tinnitus; there are not enough quality studies of this type of treatment to make a recommendation. Transcranial magnetic stimulation is a new modality, or therapeutic agent, but its long-term  benefits are unproven and cannot be recommended for treating tinnitus at this time.                  [employer].

## 2024-07-03 NOTE — PROGRESS NOTES
"SUBJECTIVE  Patient ID: Jayson Madrid is a 63 y.o. male who presents for New Patient Visit (tinnitis).    Patient previously seen by my local colleague, Colette Denton, for the same issue.    The patient reports that several months he noted progression of now persistent left-sided tinnitus. He was seen by his PCP and had steroid taper that improved his symptoms. However, 1-2 weeks later he had repeat tinnitus. Was seen by Ms. Denton who prescribed a high dose prednisone course; this did not improve his symptoms. Tinnitus is described as a high pitched ringing. Not pulsatile. No change since it started. He denies subjective change in hearing. They deny otalgia, otorrhea, and dizziness. They deny a history of prior ear surgery, and family history of hearing loss.  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 Later in his job wore hearing protection. Reports that hearing tests demonstrated hearing loss. Had melanoma of the right eye treated with \"disc of radiation.\"    Review of Systems  Complete ROS negative except as noted above or on patient intake form and as above.    OBJECTIVE  Physical Exam  CONSTITUTIONAL: Well appearing male who appears stated age.  PSYCHIATRIC: Alert, appropriate mood and affect.  RESPIRATORY: Normal inspiration and expiration and chest wall expansion; no use of accessory muscles to breathe.  VOICE: Clear speech without hoarseness. No stridor nor stertor.  HEAD AND FACE: Symmetric facial features. No cutaneous masses or lesions were visualized.  RIGHT EAR:  Normal external ear and post auricular area, no visible lesions, external auditory canal patent, small osteoma along the anterior wall,  tympanic membrane intact, no retraction, no signs of mass, effusion, or infection within the middle ear.  LEFT EAR: Normal external ear and post auricular area, no visible lesions, external auditory canal patent, small osteoma along the anterior wall, " tympanic membrane intact, no retraction, no signs of mass, effusion, or infection within the middle ear.    --------------------------------------------------  Audiology:  I personally reviewed the patient's audiogram from 2/29/2024.  This demonstrated an asymmetric sensorineural hearing loss.  The patient has a mild high-frequency sensorineural hearing loss on the right.  On the left the patient has a normal sloping to moderate sensorineural hearing loss.  He had excellent word recognition scores, type A tympanograms, and preserved acoustic reflexes bilaterally.     --------------------------------------------------    ASSESSMENT/PLAN  Diagnoses and all orders for this visit:  Asymmetrical sensorineural hearing loss  -     MR IAC w and wo IV contrast; Future  Tinnitus of left ear  Osteoma of ear canal      63 y.o. male presenting today with persistent left-sided tinnitus.    1.  Left-sided tinnitus  The patient reports that he had progressive episodes of left-sided tinnitus which was initially intermittent nature and now is constant.  Patient was initially evaluated by one of my ENT colleagues in March 2024.  The etiology of tinnitus and its theorized connection to hearing loss was discussed with the patient. We discussed the patient's options which include continued observation, medications, masking strategies, and cognitive behavioral therapy.  We discussed how there is no evidence that any one medication is helpful in treatment.  I advised the patient that hearing aids may be helpful in masking the tinnitus.    2.  Asymmetric sensorineural hearing loss  The patient is also noted on audiogram to have an asymmetric sensorineural hearing loss worse on the left.  The etiologies of hearing loss were discussed with the patient.  I suspect that at least some of this is secondary to the patient's noise exposure from his occupation in his youth.  We discussed the patient's options which include continued observation and  amplification.  We discussed how there is no true treatment for hearing loss.  I advised the patient to protect their remaining hearing.    In addition, we discussed the rare risk that this asymmetry may herald a vestibular schwannoma.  I recommended that they obtain an MRI to better assess for this possibility.  A requisition was provided; I can call/contact him with results.    It was recommended the patient get a follow-up audiogram on a yearly basis.    3.  Bilateral ear canal osteomas  The patient was incidentally noted on exam to have bilateral ear canal osteomas.  He was quite small in nature and not obstructive of the ear canals.  These can be safely observed unless they grow to be obstructive.    He can otherwise see me as needed.    This note was created using speech recognition transcription software. Despite proofreading, typographical errors may be present that affect the meaning of the content. Please contact my office with any questions.

## 2024-07-03 NOTE — LETTER
"July 3, 2024     Ady Davidson,   [address]  [facility] [address]    Patient: Jayson Madrid   YOB: 1960   Date of Visit: 7/3/2024       Dear Dr. Ady Davidson, :    Thank you for referring Jayson Madrid to me for evaluation. Below are my notes for this consultation.  If you have questions, please do not hesitate to call me. I look forward to following your patient along with you.       Sincerely,     Wei Santoyo MD      CC: No Recipients  ______________________________________________________________________________________    SUBJECTIVE  Patient ID: Jayson Madrid is a 63 y.o. male who presents for New Patient Visit (tinnitis).    Patient previously seen by my local colleague, Colette Denton, for the same issue.    The patient reports that several months he noted progression of now persistent left-sided tinnitus. He was seen by his PCP and had steroid taper that improved his symptoms. However, 1-2 weeks later he had repeat tinnitus. Was seen by Ms. Denton who prescribed a high dose prednisone course; this did not improve his symptoms. Tinnitus is described as a high pitched ringing. Not pulsatile. No change since it started. He denies subjective change in hearing. They deny otalgia, otorrhea, and dizziness. They deny a history of prior ear surgery, and family history of hearing loss.  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 Later in his job wore hearing protection. Reports that hearing tests demonstrated hearing loss. Had melanoma of the right eye treated with \"disc of radiation.\"    Review of Systems  Complete ROS negative except as noted above or on patient intake form and as above.    OBJECTIVE  Physical Exam  CONSTITUTIONAL: Well appearing male who appears stated age.  PSYCHIATRIC: Alert, appropriate mood and affect.  RESPIRATORY: Normal inspiration and expiration and chest wall expansion; no use of " accessory muscles to breathe.  VOICE: Clear speech without hoarseness. No stridor nor stertor.  HEAD AND FACE: Symmetric facial features. No cutaneous masses or lesions were visualized.  RIGHT EAR:  Normal external ear and post auricular area, no visible lesions, external auditory canal patent, small osteoma along the anterior wall,  tympanic membrane intact, no retraction, no signs of mass, effusion, or infection within the middle ear.  LEFT EAR: Normal external ear and post auricular area, no visible lesions, external auditory canal patent, small osteoma along the anterior wall, tympanic membrane intact, no retraction, no signs of mass, effusion, or infection within the middle ear.    --------------------------------------------------  Audiology:  I personally reviewed the patient's audiogram from 2/29/2024.  This demonstrated an asymmetric sensorineural hearing loss.  The patient has a mild high-frequency sensorineural hearing loss on the right.  On the left the patient has a normal sloping to moderate sensorineural hearing loss.  He had excellent word recognition scores, type A tympanograms, and preserved acoustic reflexes bilaterally.     --------------------------------------------------    ASSESSMENT/PLAN  Diagnoses and all orders for this visit:  Asymmetrical sensorineural hearing loss  -     MR IAC w and wo IV contrast; Future  Tinnitus of left ear  Osteoma of ear canal      63 y.o. male presenting today with persistent left-sided tinnitus.    1.  Left-sided tinnitus  The patient reports that he had progressive episodes of left-sided tinnitus which was initially intermittent nature and now is constant.  Patient was initially evaluated by one of my ENT colleagues in March 2024.  The etiology of tinnitus and its theorized connection to hearing loss was discussed with the patient. We discussed the patient's options which include continued observation, medications, masking strategies, and cognitive behavioral  therapy.  We discussed how there is no evidence that any one medication is helpful in treatment.  I advised the patient that hearing aids may be helpful in masking the tinnitus.    2.  Asymmetric sensorineural hearing loss  The patient is also noted on audiogram to have an asymmetric sensorineural hearing loss worse on the left.  The etiologies of hearing loss were discussed with the patient.  I suspect that at least some of this is secondary to the patient's noise exposure from his occupation in his youth.  We discussed the patient's options which include continued observation and amplification.  We discussed how there is no true treatment for hearing loss.  I advised the patient to protect their remaining hearing.    In addition, we discussed the rare risk that this asymmetry may herald a vestibular schwannoma.  I recommended that they obtain an MRI to better assess for this possibility.  A requisition was provided; I can call/contact him with results.    It was recommended the patient get a follow-up audiogram on a yearly basis.    3.  Bilateral ear canal osteomas  The patient was incidentally noted on exam to have bilateral ear canal osteomas.  He was quite small in nature and not obstructive of the ear canals.  These can be safely observed unless they grow to be obstructive.    He can otherwise see me as needed.    This note was created using speech recognition transcription software. Despite proofreading, typographical errors may be present that affect the meaning of the content. Please contact my office with any questions.

## 2024-07-16 ENCOUNTER — LAB (OUTPATIENT)
Dept: LAB | Facility: LAB | Age: 64
End: 2024-07-16
Payer: COMMERCIAL

## 2024-07-16 DIAGNOSIS — E78.5 HYPERLIPIDEMIA, UNSPECIFIED HYPERLIPIDEMIA TYPE: ICD-10-CM

## 2024-07-16 LAB
ALBUMIN SERPL BCP-MCNC: 4.5 G/DL (ref 3.4–5)
ALP SERPL-CCNC: 70 U/L (ref 33–136)
ALT SERPL W P-5'-P-CCNC: 31 U/L (ref 10–52)
ANION GAP SERPL CALC-SCNC: 13 MMOL/L (ref 10–20)
AST SERPL W P-5'-P-CCNC: 17 U/L (ref 9–39)
BILIRUB SERPL-MCNC: 0.6 MG/DL (ref 0–1.2)
BUN SERPL-MCNC: 21 MG/DL (ref 6–23)
CALCIUM SERPL-MCNC: 9.3 MG/DL (ref 8.6–10.6)
CHLORIDE SERPL-SCNC: 104 MMOL/L (ref 98–107)
CHOLEST SERPL-MCNC: 178 MG/DL (ref 0–199)
CHOLESTEROL/HDL RATIO: 6.8
CO2 SERPL-SCNC: 27 MMOL/L (ref 21–32)
CREAT SERPL-MCNC: 1.19 MG/DL (ref 0.5–1.3)
EGFRCR SERPLBLD CKD-EPI 2021: 69 ML/MIN/1.73M*2
GLUCOSE SERPL-MCNC: 101 MG/DL (ref 74–99)
HDLC SERPL-MCNC: 26.3 MG/DL
LDLC SERPL CALC-MCNC: ABNORMAL MG/DL
NON HDL CHOLESTEROL: 152 MG/DL (ref 0–149)
POTASSIUM SERPL-SCNC: 4 MMOL/L (ref 3.5–5.3)
PROT SERPL-MCNC: 6.8 G/DL (ref 6.4–8.2)
SODIUM SERPL-SCNC: 140 MMOL/L (ref 136–145)
TRIGL SERPL-MCNC: 490 MG/DL (ref 0–149)
VLDL: ABNORMAL

## 2024-07-16 PROCEDURE — 80061 LIPID PANEL: CPT

## 2024-07-16 PROCEDURE — 80053 COMPREHEN METABOLIC PANEL: CPT

## 2024-07-16 PROCEDURE — 36415 COLL VENOUS BLD VENIPUNCTURE: CPT

## 2024-07-18 ENCOUNTER — APPOINTMENT (OUTPATIENT)
Dept: OTOLARYNGOLOGY | Facility: CLINIC | Age: 64
End: 2024-07-18
Payer: COMMERCIAL

## 2024-07-24 ENCOUNTER — TELEPHONE (OUTPATIENT)
Dept: PRIMARY CARE | Facility: CLINIC | Age: 64
End: 2024-07-24
Payer: COMMERCIAL

## 2024-07-24 DIAGNOSIS — E78.5 HYPERLIPIDEMIA, UNSPECIFIED HYPERLIPIDEMIA TYPE: ICD-10-CM

## 2024-07-24 DIAGNOSIS — E78.2 MIXED HYPERLIPIDEMIA: Primary | ICD-10-CM

## 2024-07-24 DIAGNOSIS — E78.2 MIXED HYPERLIPIDEMIA: ICD-10-CM

## 2024-07-24 RX ORDER — FENOFIBRATE 160 MG/1
160 TABLET ORAL DAILY
Qty: 90 TABLET | Refills: 0 | Status: SHIPPED
Start: 2024-07-24 | End: 2024-07-24 | Stop reason: SDUPTHER

## 2024-07-24 RX ORDER — FENOFIBRATE 160 MG/1
160 TABLET ORAL DAILY
Qty: 90 TABLET | Refills: 0 | Status: SHIPPED | OUTPATIENT
Start: 2024-07-24 | End: 2024-10-22

## 2024-07-24 RX ORDER — ICOSAPENT ETHYL 1 G/1
2 CAPSULE ORAL
Qty: 360 CAPSULE | Refills: 3 | Status: SHIPPED | OUTPATIENT
Start: 2024-07-24 | End: 2024-07-24 | Stop reason: SDUPTHER

## 2024-07-24 RX ORDER — ICOSAPENT ETHYL 1 G/1
2 CAPSULE ORAL
Qty: 360 CAPSULE | Refills: 3 | Status: SHIPPED
Start: 2024-07-24 | End: 2024-07-24 | Stop reason: SDUPTHER

## 2024-07-24 RX ORDER — ICOSAPENT ETHYL 1 G/1
2 CAPSULE ORAL
Qty: 360 CAPSULE | Refills: 3 | Status: SHIPPED | OUTPATIENT
Start: 2024-07-24 | End: 2025-07-24

## 2024-07-29 ENCOUNTER — HOSPITAL ENCOUNTER (OUTPATIENT)
Dept: RADIOLOGY | Facility: CLINIC | Age: 64
Discharge: HOME | End: 2024-07-29
Payer: COMMERCIAL

## 2024-07-29 DIAGNOSIS — H90.3 ASYMMETRICAL SENSORINEURAL HEARING LOSS: ICD-10-CM

## 2024-07-29 PROCEDURE — 70553 MRI BRAIN STEM W/O & W/DYE: CPT | Performed by: RADIOLOGY

## 2024-07-29 PROCEDURE — 2550000001 HC RX 255 CONTRASTS: Performed by: STUDENT IN AN ORGANIZED HEALTH CARE EDUCATION/TRAINING PROGRAM

## 2024-07-29 PROCEDURE — 70553 MRI BRAIN STEM W/O & W/DYE: CPT

## 2024-07-29 PROCEDURE — A9575 INJ GADOTERATE MEGLUMI 0.1ML: HCPCS | Performed by: STUDENT IN AN ORGANIZED HEALTH CARE EDUCATION/TRAINING PROGRAM

## 2024-07-29 RX ORDER — GADOTERATE MEGLUMINE 376.9 MG/ML
19 INJECTION INTRAVENOUS
Status: COMPLETED | OUTPATIENT
Start: 2024-07-29 | End: 2024-07-29

## 2024-07-31 ENCOUNTER — TELEPHONE (OUTPATIENT)
Dept: OTOLARYNGOLOGY | Facility: CLINIC | Age: 64
End: 2024-07-31
Payer: COMMERCIAL

## 2024-07-31 DIAGNOSIS — R90.89 ABNORMAL BRAIN MRI: Primary | ICD-10-CM

## 2024-07-31 NOTE — TELEPHONE ENCOUNTER
Called patient to discuss recent imaging results. No concerning lesion of the left IAC/CPA. However, area of enhancement noted at the right trigeminal nerve. Patient denies facial numbness/tingling. History of ocular melanoma. Discussed options including follow-up study vs. NSGY referral. Interested in evaluation; will place referral.    Also noting frontal and partial ethmoid opacificaiton. Denies CRS but does get frequent rhinorrhea with meals. Discussed possible medical therapy but deferring at this time.    Noting some trouble occasionally with swallow. Notes that food will get stuck and he will need to vomit it up occasionally. Will arrange follow-up on my return from leave.

## 2024-08-22 ENCOUNTER — OFFICE VISIT (OUTPATIENT)
Dept: NEUROSURGERY | Facility: CLINIC | Age: 64
End: 2024-08-22
Payer: COMMERCIAL

## 2024-08-22 VITALS
DIASTOLIC BLOOD PRESSURE: 70 MMHG | HEART RATE: 56 BPM | TEMPERATURE: 97.4 F | BODY MASS INDEX: 27.92 KG/M2 | SYSTOLIC BLOOD PRESSURE: 112 MMHG | WEIGHT: 195 LBS | HEIGHT: 70 IN

## 2024-08-22 DIAGNOSIS — R90.89 ABNORMAL BRAIN MRI: ICD-10-CM

## 2024-08-22 PROCEDURE — 3008F BODY MASS INDEX DOCD: CPT | Performed by: NEUROLOGICAL SURGERY

## 2024-08-22 PROCEDURE — 99212 OFFICE O/P EST SF 10 MIN: CPT | Performed by: NEUROLOGICAL SURGERY

## 2024-08-22 PROCEDURE — 1036F TOBACCO NON-USER: CPT | Performed by: NEUROLOGICAL SURGERY

## 2024-08-22 ASSESSMENT — PAIN SCALES - GENERAL: PAINLEVEL: 0-NO PAIN

## 2024-08-22 NOTE — PROGRESS NOTES
"[facility]  Neurosurgery    History of Present Illness      Jayson Madrid is a 64-year-old male with a PMH significant for HLD, cervical spondylosis with radiculopathy, OA, ocular melanoma s/p radiation, who presented for evaluation of left sided tinnitus with his PCP. Patient was treated with a steroid taper that initially improved his symptoms but after 1-2 weeks the tinnitus returned. Audiogram 02/2024 with ASNHL with mild high-frequency SNHL on the right and moderate SNHL on the left; bilateral word discrimination was excellent. Patient was referred to ENT and seen by Dr. Santoyo who ordered a MRI for further evaluation. 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 Given patients history of ocular melanoma and MR findings patient was referred to neurosurgery. He presents to clinic for NPV.       He presents today for evaluation of left ear tinnitus. He reports it's constant, not pulsatile, it started approximately Feburary-March. He underwent MRI that was negative for any left sided pathology to explain his symptoms. He denies weakness, numbness, tingling, facial pain or weakness, blurry or double vision.             Objective      Vitals:   /70   Pulse 56   Temp 36.3 °C (97.4 °F)   Ht 1.778 m (5' 10\")   Wt 88.5 kg (195 lb)   BMI 27.98 kg/m²         Physical Exam:    Awake, alert, oriented times 3.   EOM intact  Pupils are equal round and reactive  Intact facial sensation bilaterla  No facial asymmetry  Intact hearing bilateraly  Midline tongue protrusion  Symmetric shoulder elevation  Symmetric head turning    5/5 in all extremities  No sensory deficits        Relevant Results:                    Assessment & Plan      Diagnosis:  Jayson was seen today for new patient visit.  Diagnoses and all orders for this visit:  Abnormal brain MRI  -     Referral to Neurosurgery          Provider " Impression:     Mr. Madrid presents to clinic for evaluation of left ear tinnitus. His MRI does not reveal an explanation of his tinnitus and I do not think it has a neurosurgical etiology. There is a punctate focus of enhancement in the right trigeminal nerve, unclear if it is artifactual or represents mass. We recommend that he undergoes repeat MRI brain with and without contrast in 6 months to evaluate for any changes.    Total time for this visit was 20 minutes.    Medical History     Past Medical History:   Diagnosis Date    Melanoma (Multi)     of right eye     Past Surgical History:   Procedure Laterality Date    OTHER SURGICAL HISTORY  08/07/2019    Eye surgery     Social History     Tobacco Use    Smoking status: Never    Smokeless tobacco: Never   Substance Use Topics    Alcohol use: Not Currently     Comment: Rarely    Drug use: Never     Family History   Problem Relation Name Age of Onset    No Known Problems Mother      No Known Problems Father      Cancer Other       No Known Allergies  Current Outpatient Medications   Medication Instructions    fenofibrate (TRIGLIDE) 160 mg, oral, Daily    icosapent ethyL (VASCEPA) 2 g, oral, 2 times daily (morning and late afternoon)    predniSONE (Deltasone) 10 mg tablet Take 60 mg (6 tabs) for 7 days, then take 50 mg (5 tabs) for 1 day, 40 mg (4 tabs) for 1 day, 30 mg (3 tabs) for 1 day, 20 mg ( 2 tabs) for 1 day, and 10 mg (1 tab) for 1 day.    simvastatin (ZOCOR) 20 mg, oral, Daily

## 2024-08-27 DIAGNOSIS — E78.5 HYPERLIPIDEMIA, UNSPECIFIED HYPERLIPIDEMIA TYPE: ICD-10-CM

## 2024-08-27 RX ORDER — SIMVASTATIN 20 MG/1
20 TABLET, FILM COATED ORAL DAILY
Qty: 90 TABLET | Refills: 3 | Status: SHIPPED | OUTPATIENT
Start: 2024-08-27

## 2024-08-28 ENCOUNTER — OFFICE VISIT (OUTPATIENT)
Dept: OTOLARYNGOLOGY | Facility: CLINIC | Age: 64
End: 2024-08-28
Payer: COMMERCIAL

## 2024-08-28 VITALS
TEMPERATURE: 97.2 F | DIASTOLIC BLOOD PRESSURE: 58 MMHG | WEIGHT: 202 LBS | BODY MASS INDEX: 28.92 KG/M2 | SYSTOLIC BLOOD PRESSURE: 117 MMHG | OXYGEN SATURATION: 96 % | HEIGHT: 70 IN | HEART RATE: 58 BPM

## 2024-08-28 DIAGNOSIS — R09.81 NASAL CONGESTION: ICD-10-CM

## 2024-08-28 DIAGNOSIS — J34.2 NASAL SEPTAL DEVIATION: ICD-10-CM

## 2024-08-28 DIAGNOSIS — R06.83 SNORING: Primary | ICD-10-CM

## 2024-08-28 DIAGNOSIS — R13.10 DYSPHAGIA, UNSPECIFIED TYPE: ICD-10-CM

## 2024-08-28 PROCEDURE — 3008F BODY MASS INDEX DOCD: CPT | Performed by: STUDENT IN AN ORGANIZED HEALTH CARE EDUCATION/TRAINING PROGRAM

## 2024-08-28 PROCEDURE — 1036F TOBACCO NON-USER: CPT | Performed by: STUDENT IN AN ORGANIZED HEALTH CARE EDUCATION/TRAINING PROGRAM

## 2024-08-28 PROCEDURE — 99213 OFFICE O/P EST LOW 20 MIN: CPT | Performed by: STUDENT IN AN ORGANIZED HEALTH CARE EDUCATION/TRAINING PROGRAM

## 2024-08-28 ASSESSMENT — ENCOUNTER SYMPTOMS
LOSS OF SENSATION IN FEET: 0
DEPRESSION: 0
OCCASIONAL FEELINGS OF UNSTEADINESS: 0

## 2024-08-28 ASSESSMENT — LIFESTYLE VARIABLES
HOW OFTEN DO YOU HAVE A DRINK CONTAINING ALCOHOL: NEVER
HOW OFTEN DO YOU HAVE SIX OR MORE DRINKS ON ONE OCCASION: NEVER

## 2024-08-28 ASSESSMENT — PAIN SCALES - GENERAL: PAINLEVEL: 0-NO PAIN

## 2024-08-28 NOTE — PROGRESS NOTES
"SUBJECTIVE  Patient ID: Jayson Madrid is a 64 y.o. male who presents for Follow-up (MRI follow up).    History 7/3/2024:  Patient previously seen by my local colleague, Colette Denton, for the same issue.    The patient reports that several months he noted progression of now persistent left-sided tinnitus. He was seen by his PCP and had steroid taper that improved his symptoms. However, 1-2 weeks later he had repeat tinnitus. Was seen by Ms. Denton who prescribed a high dose prednisone course; this did not improve his symptoms. Tinnitus is described as a high pitched ringing. Not pulsatile. No change since it started. He denies subjective change in hearing. They deny otalgia, otorrhea, and dizziness. They deny a history of prior ear surgery, and family history of hearing loss.  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 Later in his job wore hearing protection. Reports that hearing tests demonstrated hearing loss. Had melanoma of the right eye treated with \"disc of radiation.\"    Update 8/28/2024:  Follow-up for new concern. He reports that he will have episodes of dysphagia; happens 2-3 times a year. He will note that smaller foods like rice seem to get stuck. He feels like it won't go down so he will cough until he throws up. Gets reflux disease once a week vs \"a few times a month.\" No odynophagia. Never a smoker. No trouble swallowing pills.  He does admit to loud snoring. His wife is concerned he might \"stop breathing.\" He notes good sleep quality; not appreciting tiredness in the morning.  Also notes some nasal congestion and drainage in the mornings only.  Reports that he blows out his nose which seems to clear things.  Otherwise does not have any other nasal concerns.    OBJECTIVE  Physical Exam  CONSTITUTIONAL: Well appearing male who appears stated age. BMI 29.  PSYCHIATRIC: Alert, appropriate mood and affect.  RESPIRATORY: Normal inspiration and expiration " and chest wall expansion; no use of accessory muscles to breathe.  VOICE: Clear speech without hoarseness. No stridor nor stertor.  HEAD AND FACE: Symmetric facial features. No cutaneous masses or lesions were visualized.  RIGHT EAR:  Normal external ear and post auricular area, no visible lesions, external auditory canal patent, small osteoma along the anterior wall,  tympanic membrane intact, no retraction, no signs of mass, effusion, or infection within the middle ear.  LEFT EAR: Normal external ear and post auricular area, no visible lesions, external auditory canal patent, small osteoma along the anterior wall, tympanic membrane intact, no retraction, no signs of mass, effusion, or infection within the middle ear.  NOSE: Nasal dorsum was midline; tip deviated to the right. Anterior rhinoscopy demonstrated a septum deviated right. Inferior turbinates were not hypertrophied. No obvious nasal masses, polyps, mucopurulence, nor other lesions were appreciated.  ORAL CAVITY: Lips were without lesions. Moist mucous membranes. No lesions appreciated along the gingiva, oral mucosa, nor tongue.  DENTITION: Grossly normal without obvious infection nor inflammation. Class I occlusion; mild attrition.  OROPHARYNX: No lesion nor mucosal abnormality. The uvula was elongated. The tonsils were 1-2+ and slightly cryptic. Jimenez class I/I conditioning will call you I was a good past who is good that he is okay on the right due to previous stand down below if you decide the back of Medicus process course he has sleep apnea, trigger little weight loss but is a chronic thing that is depending on your level of concern your white cells are concerned make sense of consider checking because over time there is some heart and lung issues that can develop with the sleep apnea statin Ia airway.  NECK: Visualization and palpation of the neck revealed no mass lesions, no thyromegaly or thyroid masses. No cutaneous lesions  appreciated.  LYMPHATICS (CERVICAL): There were no palpable lymph nodes in the posterior triangle, submandibular triangle, jugulodigastric region, nor central neck.  NEUROLOGIC: Cranial nerves III, IV, and VI were noted to be intact via extra-ocular muscle movement testing. Cranial nerve V was noted to be intact to soft touch bilaterally. Cranial nerve VII was noted to be intact and symmetric by facial movement. Cranial nerve VIII was tested with normal voice examination and revealed grossly normal hearing. Cranial nerves IX and X noted to be intact by palatal movement. Cranial nerve XI noted to be intact via shoulder shrug.  Cranial nerve XII noted to be intact with active and symmetric tongue movement.     --------------------------------------------------  Audiology:  I personally reviewed the patient's audiogram from 2/29/2024.  This demonstrated an asymmetric sensorineural hearing loss.  The patient has a mild high-frequency sensorineural hearing loss on the right.  On the left the patient has a normal sloping to moderate sensorineural hearing loss.  He had excellent word recognition scores, type A tympanograms, and preserved acoustic reflexes bilaterally.     --------------------------------------------------    ASSESSMENT/PLAN  Diagnoses and all orders for this visit:  Snoring  Nasal septal deviation  Nasal congestion  Dysphagia, unspecified type      64 y.o. male presenting today with persistent left-sided tinnitus.    1.  Left-sided tinnitus  The patient reports that he had progressive episodes of left-sided tinnitus which was initially intermittent nature and now is constant.  Patient was initially evaluated by one of my ENT colleagues in March 2024.  The etiology of tinnitus and its theorized connection to hearing loss was discussed with the patient. We discussed the patient's options which include continued observation, medications, masking strategies, and cognitive behavioral therapy.  We discussed how  there is no evidence that any one medication is helpful in treatment.  I advised the patient that hearing aids may be helpful in masking the tinnitus.    2.  Asymmetric sensorineural hearing loss  The patient is also noted on audiogram to have an asymmetric sensorineural hearing loss worse on the left. I suspect that at least some of this is secondary to the patient's noise exposure from his occupation in his youth.  We previously discussed the patient's options which include continued observation and amplification.  We discussed how there is no true treatment for hearing loss.  I advised the patient to protect their remaining hearing.  Since our initial visit the patient had an MRI IAC which demonstrated no concerning lesions of the CPA nor IAC.  However, there was some enhancement along the trigeminal nerve and he has not seen neurosurgery who recommended repeat imaging.    3.  Bilateral ear canal osteomas  The patient was incidentally noted on exam to have bilateral ear canal osteomas.  He was quite small in nature and not obstructive of the ear canals.  These can be safely observed unless they grow to be obstructive.    4.  Snoring, questionable ANGELA  The patient notes decades history of loud snoring. Their physical exam findings (1+ tonsils, Jimenez class I/IIa airway) were discussed.  The complex nature of snoring was discussed with the patient.  The patient reports that his wife has expressed some concerns about his breathing at night.  We discussed potential evaluation with a sleep study at home; he wishes to discuss with his wife before pursuing.    5.  Nasal congestion  The patient also notes nasal congestion and drainage most mornings.  This clears with blowing his nose.  Given his history of snoring I suspect that this is simply physiologic in the setting of driving at night.  I recommended saline spray versus irrigations as needed.    He can otherwise see me as needed.    This note was created using  speech recognition transcription software. Despite proofreading, typographical errors may be present that affect the meaning of the content. Please contact my office with any questions.

## 2024-10-22 ENCOUNTER — LAB (OUTPATIENT)
Dept: LAB | Facility: LAB | Age: 64
End: 2024-10-22
Payer: COMMERCIAL

## 2024-10-22 DIAGNOSIS — E78.2 MIXED HYPERLIPIDEMIA: ICD-10-CM

## 2024-10-22 PROCEDURE — 80061 LIPID PANEL: CPT

## 2024-10-22 PROCEDURE — 80053 COMPREHEN METABOLIC PANEL: CPT

## 2024-10-22 PROCEDURE — 83036 HEMOGLOBIN GLYCOSYLATED A1C: CPT

## 2024-10-22 PROCEDURE — 84443 ASSAY THYROID STIM HORMONE: CPT

## 2024-10-22 PROCEDURE — 36415 COLL VENOUS BLD VENIPUNCTURE: CPT

## 2024-10-23 ENCOUNTER — TELEPHONE (OUTPATIENT)
Dept: PRIMARY CARE | Facility: CLINIC | Age: 64
End: 2024-10-23
Payer: COMMERCIAL

## 2024-10-23 DIAGNOSIS — E78.2 MIXED HYPERLIPIDEMIA: ICD-10-CM

## 2024-10-23 LAB
ALBUMIN SERPL BCP-MCNC: 4.7 G/DL (ref 3.4–5)
ALP SERPL-CCNC: 51 U/L (ref 33–136)
ALT SERPL W P-5'-P-CCNC: 21 U/L (ref 10–52)
ANION GAP SERPL CALC-SCNC: 15 MMOL/L (ref 10–20)
AST SERPL W P-5'-P-CCNC: 15 U/L (ref 9–39)
BILIRUB SERPL-MCNC: 0.9 MG/DL (ref 0–1.2)
BUN SERPL-MCNC: 11 MG/DL (ref 6–23)
CALCIUM SERPL-MCNC: 9.6 MG/DL (ref 8.6–10.6)
CHLORIDE SERPL-SCNC: 103 MMOL/L (ref 98–107)
CHOLEST SERPL-MCNC: 123 MG/DL (ref 0–199)
CHOLESTEROL/HDL RATIO: 3.8
CO2 SERPL-SCNC: 26 MMOL/L (ref 21–32)
CREAT SERPL-MCNC: 1.07 MG/DL (ref 0.5–1.3)
EGFRCR SERPLBLD CKD-EPI 2021: 78 ML/MIN/1.73M*2
EST. AVERAGE GLUCOSE BLD GHB EST-MCNC: 97 MG/DL
GLUCOSE SERPL-MCNC: 92 MG/DL (ref 74–99)
HBA1C MFR BLD: 5 %
HDLC SERPL-MCNC: 32.5 MG/DL
LDLC SERPL CALC-MCNC: 69 MG/DL
NON HDL CHOLESTEROL: 91 MG/DL (ref 0–149)
POTASSIUM SERPL-SCNC: 4.1 MMOL/L (ref 3.5–5.3)
PROT SERPL-MCNC: 6.8 G/DL (ref 6.4–8.2)
SODIUM SERPL-SCNC: 140 MMOL/L (ref 136–145)
TRIGL SERPL-MCNC: 108 MG/DL (ref 0–149)
TSH SERPL-ACNC: 1.58 MIU/L (ref 0.44–3.98)
VLDL: 22 MG/DL (ref 0–40)

## 2024-10-23 NOTE — TELEPHONE ENCOUNTER
----- Message from Lexii Macdonald sent at 10/23/2024  9:23 AM EDT -----  Labs reviewed, everything looks good.  Cholesterol is well controlled.

## 2024-10-23 NOTE — TELEPHONE ENCOUNTER
Called pt regarding results. Pt understood results and requested a medication refill for fenofibrate.

## 2024-10-24 RX ORDER — FENOFIBRATE 160 MG/1
160 TABLET ORAL DAILY
Qty: 90 TABLET | Refills: 0 | Status: SHIPPED | OUTPATIENT
Start: 2024-10-24 | End: 2025-01-22

## 2024-10-24 RX ORDER — FENOFIBRATE 160 MG/1
160 TABLET ORAL DAILY
Qty: 90 TABLET | Refills: 3 | Status: SHIPPED | OUTPATIENT
Start: 2024-10-24

## 2025-01-27 ENCOUNTER — HOSPITAL ENCOUNTER (OUTPATIENT)
Dept: RADIOLOGY | Facility: CLINIC | Age: 65
Discharge: HOME | End: 2025-01-27
Payer: COMMERCIAL

## 2025-01-27 DIAGNOSIS — R90.89 ABNORMAL BRAIN MRI: ICD-10-CM

## 2025-01-27 PROCEDURE — A9575 INJ GADOTERATE MEGLUMI 0.1ML: HCPCS | Performed by: NURSE PRACTITIONER

## 2025-01-27 PROCEDURE — 2550000001 HC RX 255 CONTRASTS: Performed by: NURSE PRACTITIONER

## 2025-01-27 PROCEDURE — 70553 MRI BRAIN STEM W/O & W/DYE: CPT

## 2025-01-27 PROCEDURE — 70553 MRI BRAIN STEM W/O & W/DYE: CPT | Performed by: RADIOLOGY

## 2025-01-27 RX ORDER — GADOTERATE MEGLUMINE 376.9 MG/ML
19 INJECTION INTRAVENOUS
Status: COMPLETED | OUTPATIENT
Start: 2025-01-27 | End: 2025-01-27

## 2025-01-27 RX ADMIN — GADOTERATE MEGLUMINE 19 ML: 376.9 INJECTION INTRAVENOUS at 10:25

## 2025-02-10 ENCOUNTER — APPOINTMENT (OUTPATIENT)
Dept: PRIMARY CARE | Facility: CLINIC | Age: 65
End: 2025-02-10
Payer: COMMERCIAL

## 2025-02-10 VITALS
DIASTOLIC BLOOD PRESSURE: 88 MMHG | SYSTOLIC BLOOD PRESSURE: 137 MMHG | BODY MASS INDEX: 28.63 KG/M2 | HEART RATE: 54 BPM | HEIGHT: 70 IN | OXYGEN SATURATION: 96 % | WEIGHT: 200 LBS

## 2025-02-10 DIAGNOSIS — Z00.00 PHYSICAL EXAM: ICD-10-CM

## 2025-02-10 DIAGNOSIS — E78.5 HYPERLIPIDEMIA, UNSPECIFIED HYPERLIPIDEMIA TYPE: Primary | ICD-10-CM

## 2025-02-10 PROCEDURE — 1036F TOBACCO NON-USER: CPT | Performed by: FAMILY MEDICINE

## 2025-02-10 PROCEDURE — 3008F BODY MASS INDEX DOCD: CPT | Performed by: FAMILY MEDICINE

## 2025-02-10 PROCEDURE — 99396 PREV VISIT EST AGE 40-64: CPT | Performed by: FAMILY MEDICINE

## 2025-02-10 ASSESSMENT — PROMIS GLOBAL HEALTH SCALE
RATE_AVERAGE_FATIGUE: MILD
RATE_GENERAL_HEALTH: GOOD
RATE_AVERAGE_PAIN: 1
RATE_MENTAL_HEALTH: GOOD
RATE_PHYSICAL_HEALTH: GOOD
RATE_QUALITY_OF_LIFE: GOOD
CARRYOUT_PHYSICAL_ACTIVITIES: COMPLETELY
CARRYOUT_SOCIAL_ACTIVITIES: VERY GOOD
EMOTIONAL_PROBLEMS: RARELY
RATE_SOCIAL_SATISFACTION: VERY GOOD

## 2025-02-10 ASSESSMENT — ENCOUNTER SYMPTOMS
GASTROINTESTINAL NEGATIVE: 1
MUSCULOSKELETAL NEGATIVE: 1
RESPIRATORY NEGATIVE: 1
NEUROLOGICAL NEGATIVE: 1
CONSTITUTIONAL NEGATIVE: 1
CARDIOVASCULAR NEGATIVE: 1

## 2025-02-10 ASSESSMENT — PATIENT HEALTH QUESTIONNAIRE - PHQ9
SUM OF ALL RESPONSES TO PHQ9 QUESTIONS 1 & 2: 0
2. FEELING DOWN, DEPRESSED OR HOPELESS: NOT AT ALL
1. LITTLE INTEREST OR PLEASURE IN DOING THINGS: NOT AT ALL

## 2025-02-10 NOTE — PROGRESS NOTES
Subjective   Patient ID: Jayson Madrid is a 64 y.o. male who presents for Annual Exam.  HPI  No significant exercise    Review of Systems   Constitutional: Negative.    HENT: Negative.     Respiratory: Negative.     Cardiovascular: Negative.    Gastrointestinal: Negative.    Genitourinary: Negative.    Musculoskeletal: Negative.    Skin: Negative.    Neurological: Negative.        Objective   Physical Exam  General no acute process no icterus well-hydrated alert active oriented    HEENT normocephalic no palpable tenderness eyes pupils equal reactive light and accommodation extraocular muscles intact no icterus and/or erythema ears benign external auditory canal no gross deformities nose no discharge drainage erythema bleeding throat no erythema.    Heart regular rate and rhythm without S3-S4 or murmur    Lungs clear to auscultation x2 no rales or rhonchi    Abdomen soft nontender nondistended no palpable masses no organomegaly splenomegaly.    Integument no rash no lumps bumps or concerning lesions.    Neurologic no tics tremors or seizures no decreased range of motion or ataxia.    Musculoskeletal good range of motion no gross abnormalities noted  Assessment/Plan            Ady Davidson DO 02/10/25 10:24 AM

## 2025-03-31 ENCOUNTER — HOSPITAL ENCOUNTER (OUTPATIENT)
Dept: RADIOLOGY | Facility: CLINIC | Age: 65
Discharge: HOME | End: 2025-03-31
Payer: COMMERCIAL

## 2025-03-31 DIAGNOSIS — E78.5 HYPERLIPIDEMIA, UNSPECIFIED HYPERLIPIDEMIA TYPE: ICD-10-CM

## 2025-03-31 PROCEDURE — 75571 CT HRT W/O DYE W/CA TEST: CPT

## 2025-03-31 PROCEDURE — 71046 X-RAY EXAM CHEST 2 VIEWS: CPT | Performed by: RADIOLOGY

## 2025-03-31 PROCEDURE — 71046 X-RAY EXAM CHEST 2 VIEWS: CPT

## 2025-04-11 NOTE — PROGRESS NOTES
Subjective   Patient ID: Jayson Madrid is a 63 y.o. male.  Personal hx of melenoma eye no complaints  Med Refill      Review of Systems   HENT: Negative.     Respiratory: Negative.     Cardiovascular: Negative.    Gastrointestinal: Negative.    Genitourinary: Negative.    Musculoskeletal: Negative.    Neurological: Negative.        Objective   Physical Exam  Constitutional:       Appearance: Normal appearance.   HENT:      Head: Normocephalic and atraumatic.      Nose: Nose normal.      Mouth/Throat:      Mouth: Mucous membranes are moist.   Eyes:      Pupils: Pupils are equal, round, and reactive to light.   Cardiovascular:      Rate and Rhythm: Normal rate and regular rhythm.   Pulmonary:      Effort: Pulmonary effort is normal.      Breath sounds: Normal breath sounds.   Abdominal:      General: Abdomen is flat.      Palpations: Abdomen is soft.   Musculoskeletal:         General: Normal range of motion.   Skin:     General: Skin is warm and dry.   Neurological:      Mental Status: He is alert.     Patient with a past history of melanoma of the eye which is being followed up by ophthalmology.  Patient has hyperlipidemia we will going to check his blood work triglycerides are still high we may add Tricor.  Patient also has some numbness in his right arm has a history of degenerative osteoarthritis cervical spine rating another chest x-ray of his neck may need physical therapy  Assessment/Plan   There are no diagnoses linked to this encounter.      
no

## 2025-06-22 DIAGNOSIS — E78.5 HYPERLIPIDEMIA, UNSPECIFIED HYPERLIPIDEMIA TYPE: ICD-10-CM

## 2025-06-23 RX ORDER — SIMVASTATIN 20 MG/1
20 TABLET, FILM COATED ORAL DAILY
Qty: 90 TABLET | Refills: 3 | Status: SHIPPED | OUTPATIENT
Start: 2025-06-23

## 2025-07-02 DIAGNOSIS — E78.2 MIXED HYPERLIPIDEMIA: ICD-10-CM

## 2025-07-03 RX ORDER — ICOSAPENT ETHYL 1 G/1
CAPSULE ORAL
Qty: 360 CAPSULE | Refills: 3 | Status: SHIPPED | OUTPATIENT
Start: 2025-07-03

## 2025-07-07 ENCOUNTER — CLINICAL SUPPORT (OUTPATIENT)
Dept: AUDIOLOGY | Facility: CLINIC | Age: 65
End: 2025-07-07
Payer: COMMERCIAL

## 2025-07-07 DIAGNOSIS — H93.12 TINNITUS, LEFT: Primary | ICD-10-CM

## 2025-07-07 DIAGNOSIS — H91.8X3 ASYMMETRICAL HEARING LOSS: ICD-10-CM

## 2025-07-07 PROCEDURE — 92557 COMPREHENSIVE HEARING TEST: CPT | Performed by: AUDIOLOGIST

## 2025-07-07 PROCEDURE — 92550 TYMPANOMETRY & REFLEX THRESH: CPT | Mod: 52 | Performed by: AUDIOLOGIST

## 2025-07-07 NOTE — PROGRESS NOTES
"AUDIOLOGY ADULT AUDIOMETRIC EVALUATION      Name:  Jayson Madrid  :  1960  Age:  64 y.o.  Date of Evaluation:  2025    HISTORY  Reason for visit:  tinnitus  Mr. Madrid is seen 2025 at the request of Wei Santoyo M.D. for an evaluation of hearing.      Chief complaint:    Tinnitus has become less bothersome since previous audiogram previous audiogram of 2024    Hearing loss:  no change in hearing since previous audiogram of 2024  Tinnitus:   left tinnitus starting in mid-2024  Otitis Media: denies  Otologic surgical history:  denied ear surgery;  radiation and laser surgery for cancer of right eye  Dizziness/imbalance:  denies   Otalgia:  denies  Ear pressure/fullness:  denies  History of excessive noise exposure:  yes  Other: history of melanoma of right eye     Hearing aid history: none         EVALUATION  Please find audiogram in \"Media\" tab (Document Type:  Audiology Report) or included at the bottom of this note.    RESULTS   Otoscopic Evaluation: ***     Immittance Measures (226 Hz probe tone):   ***    ***    Test technique:  standard behavioral technique via ***.  Reliability is ***.    Pure Tone Audiometry:  ***    Speech Audiometry:        Right Ear:  Speech Reception Threshold (SRT) was obtained at *** dBHL                 Speech discrimination score was ***% in quiet when words were presented at *** dBHL      Left Ear:  Speech Reception Threshold (SRT) was obtained at *** dBHL                 Speech discrimination score was ***% in quiet when words were presented at *** dBHL    IMPRESSIONS:  ***    RECOMMENDATIONS  ***    PATIENT EDUCATION  Discussed results and recommendations with ***.  Questions were addressed and the patient was encouraged to contact our department should concerns arise.       KALYN Abel, CCC-A  Licensed Audiologist    ***  "               Speech discrimination score was 92% in quiet when words were presented at 65 dBHL    IMPRESSIONS:  In comparison with previous audiogram of there has been worsening of right hearing for  8000 Hz and for left hearing for 6000 Hz; there has been improvement in left threshold at 125 Hz.      Patient is expected to have communication difficulty in adverse listening environments.    Patient is expected to benefit from effective communication strategies.      RECOMMENDATIONS  Continue with ENT follow-up with Wei Santoyo M.D.   Reassess hearing in 1 year (or sooner if medically indicated or if there is a concern for a change in hearing).    Continue with medical follow-up as indicated.      PATIENT EDUCATION  Discussed results and recommendations with patient.  Questions were addressed and the patient was encouraged to contact our department should concerns arise.       KALYN Abel, CCC-A  Licensed Audiologist

## 2025-08-19 DIAGNOSIS — E78.2 MIXED HYPERLIPIDEMIA: ICD-10-CM

## 2025-08-20 ENCOUNTER — APPOINTMENT (OUTPATIENT)
Dept: OTOLARYNGOLOGY | Facility: CLINIC | Age: 65
End: 2025-08-20
Payer: COMMERCIAL

## 2025-08-21 RX ORDER — FENOFIBRATE 160 MG/1
160 TABLET ORAL DAILY
Qty: 90 TABLET | Refills: 3 | Status: SHIPPED | OUTPATIENT
Start: 2025-08-21